# Patient Record
Sex: FEMALE | Race: WHITE
[De-identification: names, ages, dates, MRNs, and addresses within clinical notes are randomized per-mention and may not be internally consistent; named-entity substitution may affect disease eponyms.]

---

## 2017-03-23 ENCOUNTER — HOSPITAL ENCOUNTER (EMERGENCY)
Dept: HOSPITAL 26 - MED | Age: 32
Discharge: LEFT BEFORE DISCHARGE | End: 2017-03-23
Payer: SELF-PAY

## 2017-03-23 DIAGNOSIS — R10.9: Primary | ICD-10-CM

## 2017-03-23 DIAGNOSIS — Z53.21: ICD-10-CM

## 2017-09-07 ENCOUNTER — HOSPITAL ENCOUNTER (EMERGENCY)
Dept: HOSPITAL 1 - ED | Age: 32
Discharge: HOME | End: 2017-09-07
Payer: COMMERCIAL

## 2017-09-07 VITALS — HEIGHT: 67 IN | BODY MASS INDEX: 42.69 KG/M2 | WEIGHT: 271.98 LBS

## 2017-09-07 VITALS — DIASTOLIC BLOOD PRESSURE: 87 MMHG | SYSTOLIC BLOOD PRESSURE: 140 MMHG

## 2017-09-07 DIAGNOSIS — J45.909: ICD-10-CM

## 2017-09-07 DIAGNOSIS — R03.0: ICD-10-CM

## 2017-09-07 DIAGNOSIS — G89.29: Primary | ICD-10-CM

## 2017-09-07 DIAGNOSIS — M54.9: ICD-10-CM

## 2017-09-07 DIAGNOSIS — M54.31: ICD-10-CM

## 2018-05-24 ENCOUNTER — HOSPITAL ENCOUNTER (EMERGENCY)
Dept: HOSPITAL 26 - MED | Age: 33
Discharge: HOME | End: 2018-05-24
Payer: COMMERCIAL

## 2018-05-24 VITALS — SYSTOLIC BLOOD PRESSURE: 124 MMHG | DIASTOLIC BLOOD PRESSURE: 88 MMHG

## 2018-05-24 VITALS — BODY MASS INDEX: 43.83 KG/M2 | HEIGHT: 67 IN | WEIGHT: 279.25 LBS

## 2018-05-24 VITALS — SYSTOLIC BLOOD PRESSURE: 136 MMHG | DIASTOLIC BLOOD PRESSURE: 90 MMHG

## 2018-05-24 DIAGNOSIS — I10: ICD-10-CM

## 2018-05-24 DIAGNOSIS — E07.9: ICD-10-CM

## 2018-05-24 DIAGNOSIS — Z88.8: ICD-10-CM

## 2018-05-24 DIAGNOSIS — Z79.899: ICD-10-CM

## 2018-05-24 DIAGNOSIS — J45.909: ICD-10-CM

## 2018-05-24 DIAGNOSIS — M54.41: Primary | ICD-10-CM

## 2018-05-24 PROCEDURE — 81002 URINALYSIS NONAUTO W/O SCOPE: CPT

## 2018-05-24 PROCEDURE — 81025 URINE PREGNANCY TEST: CPT

## 2018-05-24 PROCEDURE — 96372 THER/PROPH/DIAG INJ SC/IM: CPT

## 2018-05-24 PROCEDURE — 99284 EMERGENCY DEPT VISIT MOD MDM: CPT

## 2018-05-28 ENCOUNTER — HOSPITAL ENCOUNTER (EMERGENCY)
Dept: HOSPITAL 26 - MED | Age: 33
Discharge: HOME | End: 2018-05-28
Payer: COMMERCIAL

## 2018-05-28 VITALS — HEIGHT: 67 IN | WEIGHT: 250 LBS | BODY MASS INDEX: 39.24 KG/M2

## 2018-05-28 VITALS — DIASTOLIC BLOOD PRESSURE: 80 MMHG | SYSTOLIC BLOOD PRESSURE: 135 MMHG

## 2018-05-28 VITALS — DIASTOLIC BLOOD PRESSURE: 102 MMHG | SYSTOLIC BLOOD PRESSURE: 142 MMHG

## 2018-05-28 DIAGNOSIS — E07.9: ICD-10-CM

## 2018-05-28 DIAGNOSIS — Z79.899: ICD-10-CM

## 2018-05-28 DIAGNOSIS — I10: ICD-10-CM

## 2018-05-28 DIAGNOSIS — J45.909: ICD-10-CM

## 2018-05-28 DIAGNOSIS — Z88.8: ICD-10-CM

## 2018-05-28 DIAGNOSIS — M54.40: ICD-10-CM

## 2018-05-28 DIAGNOSIS — M51.16: Primary | ICD-10-CM

## 2018-05-28 PROCEDURE — 99284 EMERGENCY DEPT VISIT MOD MDM: CPT

## 2018-05-28 PROCEDURE — 96372 THER/PROPH/DIAG INJ SC/IM: CPT

## 2018-05-28 PROCEDURE — 81002 URINALYSIS NONAUTO W/O SCOPE: CPT

## 2018-05-28 PROCEDURE — 72131 CT LUMBAR SPINE W/O DYE: CPT

## 2018-05-28 PROCEDURE — 81025 URINE PREGNANCY TEST: CPT

## 2018-05-28 NOTE — NUR
-------------------------------------------------------------------------------

            *** Note elianaone in EDM - 05/28/18 at 2215 by RAFFAELE ***            

-------------------------------------------------------------------------------

Patient discharged with v/s stable. Written and verbal after care instructions 
given and explained. Patient alert, oriented and verbalized understanding of 
instructions. Ambulatory with steady gait. All questions addressed prior to 
discharge. ID band removed. Patient advised to follow up with PMD. Rx of 
NAPROSYN 500MG given. Patient educated on indication of medication including 
possible reaction and side effects. Opportunity to ask questions provided and 
answered.

## 2018-05-28 NOTE — NUR
PT RESTING COMFORTABLY IN BED, PT REPORTS DECREASED PAIN, NO HIVES NOTED, PT 
DENIES ITCHING, SOB OR S/S OF ALLERGIC REACTION AT THIS TIME. ALL NEEDS MET AT 
THIS TIME.

## 2018-05-28 NOTE — NUR
Patient discharged with v/s stable. Written and verbal after care instructions 
given and explained by Dr. Keller. Patient alert, oriented and verbalized 
understanding of instructions. Ambulatory with steady gait. All questions 
addressed prior to discharge by Dr. Keller. ID band removed. Patient advised to 
follow up with PMD. Rx of NAPROSYN 500MG given. Patient educated on indication 
of medication including possible reaction and side effects by Dr. Keller. 
Opportunity to ask questions provided and answered by Dr. Keller.

## 2018-05-28 NOTE — NUR
PATIENT PRESENTS TO ED WITH RECURRING LOWER BACK PAIN RADIATING RLE X 4 
DAYS----DENIES RECENT REINJURY/TRAUMA

AMBULATORY WITH STEADY GAIT---SEEN HERE 4 DAYS AGO, RETURNS TO EXACERBATING 
PAIN

DENIES INCONTINENCE . DENIES N/V/D; SKIN IS PINK/WARM/DRY; AAOX4 WITH EVEN AND 
STEADY GAIT; LUNGS CLEAR BL; HR EVEN AND REGULAR; PT DENIES ANY FEVER, CP, SOB, 
OR COUGH AT THIS TIME; PATIENT STATES PAIN OF 10/10 AT THIS TIME; VSS; PATIENT 
POSITIONED FOR COMFORT; HOB ELEVATED; BEDRAILS UP X2; BED DOWN. ER MD MADE 
AWARE OF PT STATUS.

## 2018-05-28 NOTE — NUR
RECEIVED REPORT FROM AM NURSE. PT RESTING IN BED COMFORTABLY, RR EVEN AND 
UNLABORED. ALL NEEDS MET AT THIS TIME.

## 2020-06-27 ENCOUNTER — HOSPITAL ENCOUNTER (EMERGENCY)
Dept: HOSPITAL 26 - MED | Age: 35
Discharge: HOME | End: 2020-06-27
Payer: COMMERCIAL

## 2020-06-27 VITALS — DIASTOLIC BLOOD PRESSURE: 93 MMHG | SYSTOLIC BLOOD PRESSURE: 144 MMHG

## 2020-06-27 VITALS — DIASTOLIC BLOOD PRESSURE: 74 MMHG | SYSTOLIC BLOOD PRESSURE: 132 MMHG

## 2020-06-27 VITALS — HEIGHT: 67 IN | BODY MASS INDEX: 36.1 KG/M2 | WEIGHT: 230 LBS

## 2020-06-27 DIAGNOSIS — N39.0: ICD-10-CM

## 2020-06-27 DIAGNOSIS — E07.9: ICD-10-CM

## 2020-06-27 DIAGNOSIS — Z88.8: ICD-10-CM

## 2020-06-27 DIAGNOSIS — Z98.890: ICD-10-CM

## 2020-06-27 DIAGNOSIS — Z79.899: ICD-10-CM

## 2020-06-27 DIAGNOSIS — I10: ICD-10-CM

## 2020-06-27 DIAGNOSIS — Z90.49: ICD-10-CM

## 2020-06-27 DIAGNOSIS — J45.909: ICD-10-CM

## 2020-06-27 DIAGNOSIS — M79.641: Primary | ICD-10-CM

## 2020-06-27 LAB
ALBUMIN FLD-MCNC: 3.1 G/DL (ref 3.4–5)
ANION GAP SERPL CALCULATED.3IONS-SCNC: 12.8 MMOL/L (ref 8–16)
AST SERPL-CCNC: 19 U/L (ref 15–37)
BASOPHILS # BLD AUTO: 0 K/UL (ref 0–0.22)
BASOPHILS NFR BLD AUTO: 0.3 % (ref 0–2)
BILIRUB SERPL-MCNC: 0.4 MG/DL (ref 0–1)
BUN SERPL-MCNC: 5 MG/DL (ref 7–18)
CHLORIDE SERPL-SCNC: 100 MMOL/L (ref 98–107)
CO2 SERPL-SCNC: 27.8 MMOL/L (ref 21–32)
CREAT SERPL-MCNC: 1 MG/DL (ref 0.6–1.3)
EOSINOPHIL # BLD AUTO: 0.1 K/UL (ref 0–0.4)
EOSINOPHIL NFR BLD AUTO: 1.3 % (ref 0–4)
ERYTHROCYTE [DISTWIDTH] IN BLOOD BY AUTOMATED COUNT: 14 % (ref 11.6–13.7)
GFR SERPL CREATININE-BSD FRML MDRD: 80 ML/MIN (ref 90–?)
GLUCOSE SERPL-MCNC: 198 MG/DL (ref 74–106)
HCT VFR BLD AUTO: 35.9 % (ref 36–48)
HGB BLD-MCNC: 11.8 G/DL (ref 12–16)
LIPASE SERPL-CCNC: 95 U/L (ref 73–393)
LYMPHOCYTES # BLD AUTO: 1.7 K/UL (ref 2.5–16.5)
LYMPHOCYTES NFR BLD AUTO: 15.5 % (ref 20.5–51.1)
MCH RBC QN AUTO: 27 PG (ref 27–31)
MCHC RBC AUTO-ENTMCNC: 33 G/DL (ref 33–37)
MCV RBC AUTO: 80.8 FL (ref 80–94)
MONOCYTES # BLD AUTO: 1 K/UL (ref 0.8–1)
MONOCYTES NFR BLD AUTO: 9.7 % (ref 1.7–9.3)
NEUTROPHILS # BLD AUTO: 7.9 K/UL (ref 1.8–7.7)
NEUTROPHILS NFR BLD AUTO: 73.2 % (ref 42.2–75.2)
PLATELET # BLD AUTO: 365 K/UL (ref 140–450)
POTASSIUM SERPL-SCNC: 3.6 MMOL/L (ref 3.5–5.1)
RBC # BLD AUTO: 4.44 MIL/UL (ref 4.2–5.4)
SODIUM SERPL-SCNC: 137 MMOL/L (ref 136–145)
WBC # BLD AUTO: 10.7 K/UL (ref 4.8–10.8)

## 2020-06-27 PROCEDURE — 74022 RADEX COMPL AQT ABD SERIES: CPT

## 2020-06-27 PROCEDURE — 81025 URINE PREGNANCY TEST: CPT

## 2020-06-27 PROCEDURE — 96372 THER/PROPH/DIAG INJ SC/IM: CPT

## 2020-06-27 PROCEDURE — 85025 COMPLETE CBC W/AUTO DIFF WBC: CPT

## 2020-06-27 PROCEDURE — 99285 EMERGENCY DEPT VISIT HI MDM: CPT

## 2020-06-27 PROCEDURE — 29125 APPL SHORT ARM SPLINT STATIC: CPT

## 2020-06-27 PROCEDURE — 73130 X-RAY EXAM OF HAND: CPT

## 2020-06-27 PROCEDURE — 73110 X-RAY EXAM OF WRIST: CPT

## 2020-06-27 PROCEDURE — 81002 URINALYSIS NONAUTO W/O SCOPE: CPT

## 2020-06-27 PROCEDURE — 80053 COMPREHEN METABOLIC PANEL: CPT

## 2020-06-27 PROCEDURE — 36415 COLL VENOUS BLD VENIPUNCTURE: CPT

## 2020-06-27 PROCEDURE — 70450 CT HEAD/BRAIN W/O DYE: CPT

## 2020-06-27 PROCEDURE — 83690 ASSAY OF LIPASE: CPT

## 2020-06-27 NOTE — NUR
Patient discharged with v/s stable. Written and verbal after care instructions 
given and explained. 

Patient alert, oriented and verbalized understanding of instructions. 
Ambulatory with steady gait. All questions addressed prior to discharge. ID 
band removed. Patient advised to follow up with PMD. Rx of tramadol, flexeril, 
keflex given. Patient educated on indication of medication including possible 
reaction and side effects. Opportunity to ask questions provided and answered.
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
## 2021-01-12 ENCOUNTER — HOSPITAL ENCOUNTER (EMERGENCY)
Dept: HOSPITAL 26 - MED | Age: 36
Discharge: HOME | End: 2021-01-12
Payer: MEDICAID

## 2021-01-12 VITALS — DIASTOLIC BLOOD PRESSURE: 85 MMHG | SYSTOLIC BLOOD PRESSURE: 144 MMHG

## 2021-01-12 VITALS — BODY MASS INDEX: 31.83 KG/M2 | WEIGHT: 210 LBS | HEIGHT: 68 IN

## 2021-01-12 DIAGNOSIS — Z79.899: ICD-10-CM

## 2021-01-12 DIAGNOSIS — E07.9: ICD-10-CM

## 2021-01-12 DIAGNOSIS — E11.9: ICD-10-CM

## 2021-01-12 DIAGNOSIS — B34.9: Primary | ICD-10-CM

## 2021-01-12 DIAGNOSIS — I10: ICD-10-CM

## 2021-01-12 DIAGNOSIS — Z88.8: ICD-10-CM

## 2021-01-12 DIAGNOSIS — J45.909: ICD-10-CM

## 2021-01-12 DIAGNOSIS — Z76.0: ICD-10-CM

## 2021-01-12 DIAGNOSIS — Z20.828: ICD-10-CM

## 2021-01-12 PROCEDURE — 99283 EMERGENCY DEPT VISIT LOW MDM: CPT

## 2021-01-12 PROCEDURE — U0003 INFECTIOUS AGENT DETECTION BY NUCLEIC ACID (DNA OR RNA); SEVERE ACUTE RESPIRATORY SYNDROME CORONAVIRUS 2 (SARS-COV-2) (CORONAVIRUS DISEASE [COVID-19]), AMPLIFIED PROBE TECHNIQUE, MAKING USE OF HIGH THROUGHPUT TECHNOLOGIES AS DESCRIBED BY CMS-2020-01-R: HCPCS

## 2021-01-12 NOTE — NUR
Patient discharged with v/s stable. Written and verbal after care instructions 
given and explained. 

Patient alert, oriented and verbalized understanding of instructions. 
Ambulatory with steady gait. All questions addressed prior to discharge. ID 
band removed. Patient advised to follow up with PMD. Rx of Acetaminophen 500mg, 
Ventolin 90mcg, Promethazine DM 6.25, Metformin 1000mg given. Patient educated 
on indication of medication including possible reaction and side effects. 
Opportunity to ask questions provided and answered.

## 2021-01-12 NOTE — NUR
35/F BIB SELF C/O COUGH, FEVER, SORE THROAT, BODY ACHES X YESTERDAY. BLOOD 
SUGAR 199 AT THIS TIME. PT RUNS OUT OF MEDICINE FOR DM 2 WEEKS.

PMH: DM, ASTHMA